# Patient Record
Sex: FEMALE | Race: WHITE | NOT HISPANIC OR LATINO | URBAN - METROPOLITAN AREA
[De-identification: names, ages, dates, MRNs, and addresses within clinical notes are randomized per-mention and may not be internally consistent; named-entity substitution may affect disease eponyms.]

---

## 2024-09-30 ENCOUNTER — OFFICE VISIT (OUTPATIENT)
Dept: URGENT CARE | Age: 44
End: 2024-09-30
Payer: COMMERCIAL

## 2024-09-30 VITALS
HEART RATE: 66 BPM | OXYGEN SATURATION: 99 % | BODY MASS INDEX: 24.1 KG/M2 | HEIGHT: 68 IN | TEMPERATURE: 98.2 F | SYSTOLIC BLOOD PRESSURE: 166 MMHG | WEIGHT: 159 LBS | RESPIRATION RATE: 20 BRPM | DIASTOLIC BLOOD PRESSURE: 91 MMHG

## 2024-09-30 DIAGNOSIS — J20.9 ACUTE BRONCHITIS, UNSPECIFIED ORGANISM: Primary | ICD-10-CM

## 2024-09-30 PROCEDURE — 3008F BODY MASS INDEX DOCD: CPT | Performed by: FAMILY MEDICINE

## 2024-09-30 PROCEDURE — 1036F TOBACCO NON-USER: CPT | Performed by: FAMILY MEDICINE

## 2024-09-30 PROCEDURE — 99203 OFFICE O/P NEW LOW 30 MIN: CPT | Performed by: FAMILY MEDICINE

## 2024-09-30 RX ORDER — DESOGESTREL AND ETHINYL ESTRADIOL 21-5 (28)
1 KIT ORAL
COMMUNITY
Start: 2024-06-06

## 2024-09-30 RX ORDER — BENZONATATE 200 MG/1
200 CAPSULE ORAL 3 TIMES DAILY PRN
Qty: 42 CAPSULE | Refills: 0 | Status: SHIPPED | OUTPATIENT
Start: 2024-09-30 | End: 2024-10-30

## 2024-09-30 RX ORDER — AMOXICILLIN 875 MG/1
875 TABLET, FILM COATED ORAL 2 TIMES DAILY
Qty: 20 TABLET | Refills: 0 | Status: SHIPPED | OUTPATIENT
Start: 2024-09-30 | End: 2024-10-10

## 2024-09-30 ASSESSMENT — PAIN SCALES - GENERAL: PAINLEVEL: 0-NO PAIN

## 2024-09-30 NOTE — PROGRESS NOTES
"Subjective   Patient ID: Valeria Rodrigues is a 43 y.o. female. They present today with a chief complaint of Illness.    History of Present Illness  Subjective  Valeria Rodrigues is a 43 y.o. female who presents for evaluation of symptoms of a URI. Symptoms include productive cough with  clear colored sputum. Onset of symptoms was 2 weeks ago and has been gradually worsening since that time. Treatment to date: decongestants.    Assessment/Plan  bronchitis.    Discussed diagnosis and treatment of URI.  Suggested symptomatic OTC remedies.  Amoxicillin per orders.  Follow up as needed.        Illness      Past Medical History  Allergies as of 09/30/2024    (No Known Allergies)       (Not in a hospital admission)       History reviewed. No pertinent past medical history.    History reviewed. No pertinent surgical history.     reports that she has never smoked. She has never used smokeless tobacco. She reports current alcohol use.    Review of Systems  Review of Systems                               Objective    Vitals:    09/30/24 0814   BP: (!) 166/91   Pulse: 66   Resp: 20   Temp: 36.8 °C (98.2 °F)   TempSrc: Oral   SpO2: 99%   Weight: 72.1 kg (159 lb)   Height: 1.727 m (5' 8\")     No LMP recorded.    Physical Exam  Constitutional:       General: She is not in acute distress.     Appearance: Normal appearance. She is not ill-appearing.   HENT:      Right Ear: Tympanic membrane and ear canal normal.      Left Ear: Tympanic membrane and ear canal normal.      Nose: Nose normal.      Mouth/Throat:      Mouth: Mucous membranes are moist.   Eyes:      Extraocular Movements: Extraocular movements intact.      Pupils: Pupils are equal, round, and reactive to light.   Cardiovascular:      Rate and Rhythm: Normal rate and regular rhythm.      Pulses: Normal pulses.      Heart sounds: Normal heart sounds.   Pulmonary:      Effort: Pulmonary effort is normal.      Breath sounds: Rhonchi present.   Musculoskeletal:      Cervical " back: Normal range of motion and neck supple.   Neurological:      Mental Status: She is alert.         Procedures    Point of Care Test & Imaging Results from this visit  No results found for this visit on 09/30/24.   No results found.    Diagnostic study results (if any) were reviewed by Viola Washburn MD.    Assessment/Plan   Allergies, medications, history, and pertinent labs/EKGs/Imaging reviewed by Viola Washburn MD.       Orders and Diagnoses  There are no diagnoses linked to this encounter.    Medical Admin Record      Patient disposition: Home    Electronically signed by Viola Washburn MD  8:33 AM

## 2024-11-17 ENCOUNTER — OFFICE VISIT (OUTPATIENT)
Dept: URGENT CARE | Age: 44
End: 2024-11-17
Payer: COMMERCIAL

## 2024-11-17 ENCOUNTER — ANCILLARY PROCEDURE (OUTPATIENT)
Dept: URGENT CARE | Age: 44
End: 2024-11-17
Payer: COMMERCIAL

## 2024-11-17 VITALS
TEMPERATURE: 97.2 F | WEIGHT: 160 LBS | BODY MASS INDEX: 24.25 KG/M2 | OXYGEN SATURATION: 97 % | HEART RATE: 91 BPM | HEIGHT: 68 IN | RESPIRATION RATE: 16 BRPM | DIASTOLIC BLOOD PRESSURE: 78 MMHG | SYSTOLIC BLOOD PRESSURE: 119 MMHG

## 2024-11-17 DIAGNOSIS — J18.9 COMMUNITY ACQUIRED PNEUMONIA OF LEFT LUNG, UNSPECIFIED PART OF LUNG: Primary | ICD-10-CM

## 2024-11-17 DIAGNOSIS — J18.9 COMMUNITY ACQUIRED PNEUMONIA OF LEFT LUNG, UNSPECIFIED PART OF LUNG: ICD-10-CM

## 2024-11-17 PROCEDURE — 71046 X-RAY EXAM CHEST 2 VIEWS: CPT | Performed by: PHYSICIAN ASSISTANT

## 2024-11-17 RX ORDER — AMOXICILLIN AND CLAVULANATE POTASSIUM 875; 125 MG/1; MG/1
1 TABLET, FILM COATED ORAL 2 TIMES DAILY
Qty: 20 TABLET | Refills: 0 | Status: SHIPPED | OUTPATIENT
Start: 2024-11-17 | End: 2024-11-17

## 2024-11-17 RX ORDER — AZITHROMYCIN 250 MG/1
TABLET, FILM COATED ORAL
Qty: 6 TABLET | Refills: 0 | Status: SHIPPED | OUTPATIENT
Start: 2024-11-17 | End: 2024-11-17 | Stop reason: SINTOL

## 2024-11-17 RX ORDER — AZITHROMYCIN 250 MG/1
TABLET, FILM COATED ORAL
Qty: 6 TABLET | Refills: 0 | Status: SHIPPED | OUTPATIENT
Start: 2024-11-17

## 2024-11-17 RX ORDER — AMOXICILLIN AND CLAVULANATE POTASSIUM 875; 125 MG/1; MG/1
1 TABLET, FILM COATED ORAL 2 TIMES DAILY
Qty: 20 TABLET | Refills: 0 | Status: SHIPPED | OUTPATIENT
Start: 2024-11-17 | End: 2024-11-27

## 2024-11-17 RX ORDER — AMOXICILLIN AND CLAVULANATE POTASSIUM 875; 125 MG/1; MG/1
1 TABLET, FILM COATED ORAL EVERY 12 HOURS
Qty: 20 TABLET | Refills: 0 | Status: SHIPPED | OUTPATIENT
Start: 2024-11-17 | End: 2024-11-17

## 2024-11-17 NOTE — LETTER
November 17, 2024     Patient: Valeria Rodrigues   YOB: 1980   Date of Visit: 11/17/2024       To Whom It May Concern:    Valeria Rodrigues was seen in my clinic on 11/17/2024 at 8:20 am.     Off work 11/18/24-11/19/24, return 11/20/24.    If you have any questions or concerns, please don't hesitate to call.         Sincerely,         Aly Ashton PA-C        CC: No Recipients

## 2024-11-17 NOTE — PROGRESS NOTES
"Subjective   Patient ID: Valeria Rodrigues is a 43 y.o. female. They present today with a chief complaint of Cough.    Patient disposition: Home    HISTORY OF PRESENT ILLNESS:    This is an adult female  with a PMH of acute bronchitis presenting for 1 wk worsening productive cough, fatigue, malaise, subjective fevers. Denies CP. Admits shortness of breath. Exposed to PNA at school.    Past Medical History  Allergies as of 11/17/2024    (No Known Allergies)       (Not in a hospital admission)       No past medical history on file.    No past surgical history on file.     reports that she has never smoked. She has never used smokeless tobacco. She reports current alcohol use.    Review of Systems    Negative except as documented in the History of Present Illness.                             Objective    Vitals:    11/17/24 0825   BP: 119/78   Pulse: 91   Resp: 16   Temp: 36.2 °C (97.2 °F)   SpO2: 97%   Weight: 72.6 kg (160 lb)   Height: 1.727 m (5' 8\")     Patient's last menstrual period was 11/03/2024 (approximate).      PHYSICAL EXAMINATION:    CONSTITUTIONAL: well-appearing, nontoxic         ENT:  Head and face are unremarkable and atraumatic. Mucous membranes moist.    * Oropharynx nl. Airway patent.    * No uvular deviation. No visible abscess.    * Lymphadenopathy absent.    * TMs nl bl.         LUNGS:  Prominent L midlung and basilar rales. No wheezing, rhonchi. No increased WOB.    CARDIOVASCULAR:   RRR, no m/r/g. Nl S1/S2.    ABDOMEN:  Nontender including left upper quadrant, nondistended, no acute abdomen.     MUSCULOSKELETAL: No obvious deformities. ERNST with equal strength. Gait normal.    SKIN:   Warm and dry with no rashes.    NEURO:  Normal baseline mental status.    PSYCH: Appropriate mood and affect.         ------------------------------------------         MDM: PNA highly suspected bladimir in light of recent well-documented local PNA outbreak. CXR interpreted by me independently " demonstrated L basilar infiltrates. PNA dx confirmed and will Rx Augmentin+Azith per protocol. Pt to fu at ED if worsening.        Procedures    Diagnostic study results (if any) were reviewed by Aly Ashton PA-C.    No results found for this visit on 11/17/24.     Assessment/Plan   Allergies, medications, history, and pertinent labs/EKGs/Imaging reviewed by Aly Ashton PA-C.     Orders and Diagnoses  There are no diagnoses linked to this encounter.    Medical Admin Record      Follow Up Instructions  No follow-ups on file.    Electronically signed by Aly Ashton PA-C  8:31 AM